# Patient Record
Sex: FEMALE | Race: BLACK OR AFRICAN AMERICAN | Employment: OTHER | URBAN - METROPOLITAN AREA
[De-identification: names, ages, dates, MRNs, and addresses within clinical notes are randomized per-mention and may not be internally consistent; named-entity substitution may affect disease eponyms.]

---

## 2017-02-03 DIAGNOSIS — E78.2 MIXED HYPERLIPIDEMIA: ICD-10-CM

## 2017-02-03 RX ORDER — SIMVASTATIN 20 MG
20 TABLET ORAL EVERY EVENING
Qty: 90 TABLET | Refills: 3 | Status: SHIPPED | OUTPATIENT
Start: 2017-02-03 | End: 2017-02-14 | Stop reason: ALTCHOICE

## 2017-02-07 DIAGNOSIS — E11.9 CONTROLLED TYPE 2 DIABETES MELLITUS WITHOUT COMPLICATION, WITHOUT LONG-TERM CURRENT USE OF INSULIN (HCC): ICD-10-CM

## 2017-02-07 DIAGNOSIS — E78.2 MIXED HYPERLIPIDEMIA: ICD-10-CM

## 2017-02-07 DIAGNOSIS — I10 ESSENTIAL HYPERTENSION: ICD-10-CM

## 2017-02-07 LAB
ALBUMIN SERPL-MCNC: 4.1 G/DL (ref 3.9–4.9)
ALP BLD-CCNC: 111 U/L (ref 40–130)
ALT SERPL-CCNC: 12 U/L (ref 0–33)
ANION GAP SERPL CALCULATED.3IONS-SCNC: 15 MEQ/L (ref 7–13)
AST SERPL-CCNC: 15 U/L (ref 0–35)
BASOPHILS ABSOLUTE: 0 K/UL (ref 0–0.2)
BASOPHILS RELATIVE PERCENT: 0.6 %
BILIRUB SERPL-MCNC: 0.4 MG/DL (ref 0–1.2)
BUN BLDV-MCNC: 18 MG/DL (ref 8–23)
CALCIUM SERPL-MCNC: 9.3 MG/DL (ref 8.6–10.2)
CHLORIDE BLD-SCNC: 103 MEQ/L (ref 98–107)
CHOLESTEROL, TOTAL: 224 MG/DL (ref 0–199)
CO2: 22 MEQ/L (ref 22–29)
CREAT SERPL-MCNC: 0.92 MG/DL (ref 0.5–0.9)
CREATININE URINE: 42.5 MG/DL
EOSINOPHILS ABSOLUTE: 0.1 K/UL (ref 0–0.7)
EOSINOPHILS RELATIVE PERCENT: 2.1 %
GFR AFRICAN AMERICAN: >60
GFR NON-AFRICAN AMERICAN: 59.2
GLOBULIN: 3.1 G/DL (ref 2.3–3.5)
GLUCOSE BLD-MCNC: 264 MG/DL (ref 74–109)
HBA1C MFR BLD: 8.3 % (ref 4.8–5.9)
HCT VFR BLD CALC: 38.5 % (ref 37–47)
HDLC SERPL-MCNC: 51 MG/DL (ref 40–59)
HEMOGLOBIN: 12.6 G/DL (ref 12–16)
LDL CHOLESTEROL CALCULATED: 146 MG/DL (ref 0–129)
LYMPHOCYTES ABSOLUTE: 1.5 K/UL (ref 1–4.8)
LYMPHOCYTES RELATIVE PERCENT: 23 %
MCH RBC QN AUTO: 29.1 PG (ref 27–31.3)
MCHC RBC AUTO-ENTMCNC: 32.8 % (ref 33–37)
MCV RBC AUTO: 88.9 FL (ref 82–100)
MICROALBUMIN UR-MCNC: 66.6 MG/DL
MICROALBUMIN/CREAT UR-RTO: 1567.1 MG/G (ref 0–30)
MONOCYTES ABSOLUTE: 0.4 K/UL (ref 0.2–0.8)
MONOCYTES RELATIVE PERCENT: 6.1 %
NEUTROPHILS ABSOLUTE: 4.5 K/UL (ref 1.4–6.5)
NEUTROPHILS RELATIVE PERCENT: 68.2 %
PDW BLD-RTO: 14.9 % (ref 11.5–14.5)
PLATELET # BLD: 142 K/UL (ref 130–400)
POTASSIUM SERPL-SCNC: 4.6 MEQ/L (ref 3.5–5.1)
RBC # BLD: 4.34 M/UL (ref 4.2–5.4)
SODIUM BLD-SCNC: 140 MEQ/L (ref 132–144)
TOTAL PROTEIN: 7.2 G/DL (ref 6.4–8.1)
TRIGL SERPL-MCNC: 135 MG/DL (ref 0–200)
WBC # BLD: 6.6 K/UL (ref 4.8–10.8)

## 2017-02-14 ENCOUNTER — OFFICE VISIT (OUTPATIENT)
Dept: FAMILY MEDICINE CLINIC | Age: 77
End: 2017-02-14

## 2017-02-14 VITALS
TEMPERATURE: 98 F | HEIGHT: 65 IN | WEIGHT: 234 LBS | BODY MASS INDEX: 38.99 KG/M2 | HEART RATE: 72 BPM | RESPIRATION RATE: 18 BRPM | SYSTOLIC BLOOD PRESSURE: 138 MMHG | DIASTOLIC BLOOD PRESSURE: 80 MMHG

## 2017-02-14 DIAGNOSIS — I10 ESSENTIAL HYPERTENSION: ICD-10-CM

## 2017-02-14 DIAGNOSIS — Z78.0 MENOPAUSE: ICD-10-CM

## 2017-02-14 DIAGNOSIS — E66.01 MORBID OBESITY DUE TO EXCESS CALORIES (HCC): ICD-10-CM

## 2017-02-14 DIAGNOSIS — E11.9 CONTROLLED TYPE 2 DIABETES MELLITUS WITHOUT COMPLICATION, WITHOUT LONG-TERM CURRENT USE OF INSULIN (HCC): Primary | ICD-10-CM

## 2017-02-14 DIAGNOSIS — E78.2 MIXED HYPERLIPIDEMIA: ICD-10-CM

## 2017-02-14 DIAGNOSIS — K21.9 GASTROESOPHAGEAL REFLUX DISEASE WITHOUT ESOPHAGITIS: ICD-10-CM

## 2017-02-14 PROCEDURE — 1090F PRES/ABSN URINE INCON ASSESS: CPT | Performed by: FAMILY MEDICINE

## 2017-02-14 PROCEDURE — G8400 PT W/DXA NO RESULTS DOC: HCPCS | Performed by: FAMILY MEDICINE

## 2017-02-14 PROCEDURE — 1123F ACP DISCUSS/DSCN MKR DOCD: CPT | Performed by: FAMILY MEDICINE

## 2017-02-14 PROCEDURE — G8417 CALC BMI ABV UP PARAM F/U: HCPCS | Performed by: FAMILY MEDICINE

## 2017-02-14 PROCEDURE — G8484 FLU IMMUNIZE NO ADMIN: HCPCS | Performed by: FAMILY MEDICINE

## 2017-02-14 PROCEDURE — G8427 DOCREV CUR MEDS BY ELIG CLIN: HCPCS | Performed by: FAMILY MEDICINE

## 2017-02-14 PROCEDURE — 4040F PNEUMOC VAC/ADMIN/RCVD: CPT | Performed by: FAMILY MEDICINE

## 2017-02-14 PROCEDURE — 1036F TOBACCO NON-USER: CPT | Performed by: FAMILY MEDICINE

## 2017-02-14 PROCEDURE — 99214 OFFICE O/P EST MOD 30 MIN: CPT | Performed by: FAMILY MEDICINE

## 2017-02-14 RX ORDER — ATORVASTATIN CALCIUM 20 MG/1
20 TABLET, FILM COATED ORAL DAILY
Qty: 30 TABLET | Refills: 3 | Status: SHIPPED | OUTPATIENT
Start: 2017-02-14 | End: 2017-07-03 | Stop reason: SDUPTHER

## 2017-02-14 RX ORDER — METFORMIN HYDROCHLORIDE 500 MG/1
1500 TABLET, EXTENDED RELEASE ORAL
Qty: 270 TABLET | Refills: 3 | Status: SHIPPED | OUTPATIENT
Start: 2017-02-14 | End: 2018-03-16 | Stop reason: SDUPTHER

## 2017-02-14 ASSESSMENT — PATIENT HEALTH QUESTIONNAIRE - PHQ9
SUM OF ALL RESPONSES TO PHQ9 QUESTIONS 1 & 2: 0
2. FEELING DOWN, DEPRESSED OR HOPELESS: 0
1. LITTLE INTEREST OR PLEASURE IN DOING THINGS: 0
SUM OF ALL RESPONSES TO PHQ QUESTIONS 1-9: 0

## 2017-02-23 ENCOUNTER — HOSPITAL ENCOUNTER (OUTPATIENT)
Dept: GENERAL RADIOLOGY | Age: 77
Discharge: HOME | End: 2017-02-23
Payer: MEDICARE

## 2017-02-23 DIAGNOSIS — Z78.0 MENOPAUSE: ICD-10-CM

## 2017-02-23 PROCEDURE — 77080 DXA BONE DENSITY AXIAL: CPT

## 2017-02-27 DIAGNOSIS — K21.9 GASTROESOPHAGEAL REFLUX DISEASE WITHOUT ESOPHAGITIS: ICD-10-CM

## 2017-02-27 DIAGNOSIS — I10 ESSENTIAL HYPERTENSION: ICD-10-CM

## 2017-02-27 RX ORDER — AMLODIPINE BESYLATE 5 MG/1
5 TABLET ORAL DAILY
Qty: 90 TABLET | Refills: 3 | Status: SHIPPED | OUTPATIENT
Start: 2017-02-27 | End: 2018-04-13 | Stop reason: ALTCHOICE

## 2017-02-27 RX ORDER — OMEPRAZOLE 40 MG/1
40 CAPSULE, DELAYED RELEASE ORAL DAILY
Qty: 90 CAPSULE | Refills: 3 | Status: SHIPPED | OUTPATIENT
Start: 2017-02-27 | End: 2018-04-13 | Stop reason: SDUPTHER

## 2017-03-17 RX ORDER — RISPERIDONE 0.5 MG/1
0.5 TABLET, FILM COATED ORAL DAILY
Qty: 30 TABLET | Refills: 1 | Status: SHIPPED | OUTPATIENT
Start: 2017-03-17 | End: 2017-06-06 | Stop reason: SDUPTHER

## 2017-05-17 DIAGNOSIS — E78.2 MIXED HYPERLIPIDEMIA: ICD-10-CM

## 2017-05-17 DIAGNOSIS — E11.9 CONTROLLED TYPE 2 DIABETES MELLITUS WITHOUT COMPLICATION, WITHOUT LONG-TERM CURRENT USE OF INSULIN (HCC): ICD-10-CM

## 2017-05-17 DIAGNOSIS — I10 ESSENTIAL HYPERTENSION: ICD-10-CM

## 2017-05-17 LAB
ALBUMIN SERPL-MCNC: 4.4 G/DL (ref 3.9–4.9)
ALP BLD-CCNC: 101 U/L (ref 40–130)
ALT SERPL-CCNC: 9 U/L (ref 0–33)
ANION GAP SERPL CALCULATED.3IONS-SCNC: 16 MEQ/L (ref 7–13)
AST SERPL-CCNC: 10 U/L (ref 0–35)
BILIRUB SERPL-MCNC: 0.4 MG/DL (ref 0–1.2)
BUN BLDV-MCNC: 21 MG/DL (ref 8–23)
CALCIUM SERPL-MCNC: 9.6 MG/DL (ref 8.6–10.2)
CHLORIDE BLD-SCNC: 101 MEQ/L (ref 98–107)
CHOLESTEROL, TOTAL: 162 MG/DL (ref 0–199)
CO2: 22 MEQ/L (ref 22–29)
CREAT SERPL-MCNC: 1.16 MG/DL (ref 0.5–0.9)
GFR AFRICAN AMERICAN: 54.8
GFR NON-AFRICAN AMERICAN: 45.3
GLOBULIN: 2.9 G/DL (ref 2.3–3.5)
GLUCOSE BLD-MCNC: 245 MG/DL (ref 74–109)
HBA1C MFR BLD: 9.9 % (ref 4.8–5.9)
HDLC SERPL-MCNC: 51 MG/DL (ref 40–59)
LDL CHOLESTEROL CALCULATED: 93 MG/DL (ref 0–129)
POTASSIUM SERPL-SCNC: 4.7 MEQ/L (ref 3.5–5.1)
SODIUM BLD-SCNC: 139 MEQ/L (ref 132–144)
TOTAL PROTEIN: 7.3 G/DL (ref 6.4–8.1)
TRIGL SERPL-MCNC: 89 MG/DL (ref 0–200)

## 2017-06-06 RX ORDER — RISPERIDONE 0.5 MG/1
0.5 TABLET, FILM COATED ORAL DAILY
Qty: 30 TABLET | Refills: 1 | Status: SHIPPED | OUTPATIENT
Start: 2017-06-06 | End: 2017-08-28 | Stop reason: SDUPTHER

## 2017-07-03 DIAGNOSIS — E78.2 MIXED HYPERLIPIDEMIA: ICD-10-CM

## 2017-07-03 RX ORDER — ATORVASTATIN CALCIUM 20 MG/1
20 TABLET, FILM COATED ORAL DAILY
Qty: 30 TABLET | Refills: 3 | Status: SHIPPED | OUTPATIENT
Start: 2017-07-03 | End: 2017-12-01 | Stop reason: SDUPTHER

## 2017-08-28 RX ORDER — RISPERIDONE 0.5 MG/1
0.5 TABLET, FILM COATED ORAL DAILY
Qty: 30 TABLET | Refills: 1 | Status: SHIPPED | OUTPATIENT
Start: 2017-08-28 | End: 2017-12-01 | Stop reason: SDUPTHER

## 2017-12-01 DIAGNOSIS — E78.2 MIXED HYPERLIPIDEMIA: ICD-10-CM

## 2017-12-01 RX ORDER — RISPERIDONE 0.5 MG/1
0.5 TABLET, FILM COATED ORAL DAILY
Qty: 30 TABLET | Refills: 5 | Status: SHIPPED | OUTPATIENT
Start: 2017-12-01 | End: 2018-04-13 | Stop reason: ALTCHOICE

## 2017-12-01 RX ORDER — ATORVASTATIN CALCIUM 20 MG/1
20 TABLET, FILM COATED ORAL DAILY
Qty: 30 TABLET | Refills: 5 | Status: SHIPPED | OUTPATIENT
Start: 2017-12-01

## 2018-01-15 DIAGNOSIS — I10 ESSENTIAL HYPERTENSION: ICD-10-CM

## 2018-01-15 RX ORDER — METOPROLOL TARTRATE 100 MG/1
100 TABLET ORAL DAILY
Qty: 90 TABLET | Refills: 0 | Status: SHIPPED | OUTPATIENT
Start: 2018-01-15 | End: 2018-04-23 | Stop reason: SDUPTHER

## 2018-03-16 DIAGNOSIS — E11.9 CONTROLLED TYPE 2 DIABETES MELLITUS WITHOUT COMPLICATION, WITHOUT LONG-TERM CURRENT USE OF INSULIN (HCC): ICD-10-CM

## 2018-03-16 RX ORDER — METFORMIN HYDROCHLORIDE 500 MG/1
1500 TABLET, EXTENDED RELEASE ORAL
Qty: 90 TABLET | Refills: 0 | Status: SHIPPED | OUTPATIENT
Start: 2018-03-16 | End: 2018-04-23 | Stop reason: SDUPTHER

## 2018-03-16 NOTE — TELEPHONE ENCOUNTER
Pharmacy requests refill on medication.  Please approve or deny this request.  Last seen by you on 2/2017.  1 MONTH SUPPLY ONLY UNTIL APPT -PT MUST KEEP APPT BELOW     Future Appointments  Date Time Provider Mary Carter   4/3/2018 10:00 AM SCHEDULE, LAB [facility] PCP MARIA G Boudreaux   4/9/2018 9:30 AM Walker Cowan MD [address] [facility]

## 2018-04-03 DIAGNOSIS — E11.9 CONTROLLED TYPE 2 DIABETES MELLITUS WITHOUT COMPLICATION, WITHOUT LONG-TERM CURRENT USE OF INSULIN (HCC): Primary | ICD-10-CM

## 2018-04-03 DIAGNOSIS — E78.2 MIXED HYPERLIPIDEMIA: ICD-10-CM

## 2018-04-09 DIAGNOSIS — E78.2 MIXED HYPERLIPIDEMIA: ICD-10-CM

## 2018-04-09 DIAGNOSIS — E11.9 CONTROLLED TYPE 2 DIABETES MELLITUS WITHOUT COMPLICATION, WITHOUT LONG-TERM CURRENT USE OF INSULIN (HCC): ICD-10-CM

## 2018-04-09 LAB
ALBUMIN SERPL-MCNC: 4.1 G/DL (ref 3.9–4.9)
ALP BLD-CCNC: 115 U/L (ref 40–130)
ALT SERPL-CCNC: 18 U/L (ref 0–33)
ANION GAP SERPL CALCULATED.3IONS-SCNC: 15 MEQ/L (ref 7–13)
AST SERPL-CCNC: 15 U/L (ref 0–35)
BILIRUB SERPL-MCNC: 0.4 MG/DL (ref 0–1.2)
BUN BLDV-MCNC: 13 MG/DL (ref 8–23)
CALCIUM SERPL-MCNC: 9.5 MG/DL (ref 8.6–10.2)
CHLORIDE BLD-SCNC: 101 MEQ/L (ref 98–107)
CHOLESTEROL, TOTAL: 139 MG/DL (ref 0–199)
CO2: 25 MEQ/L (ref 22–29)
CREAT SERPL-MCNC: 0.84 MG/DL (ref 0.5–0.9)
GFR AFRICAN AMERICAN: >60
GFR NON-AFRICAN AMERICAN: >60
GLOBULIN: 2.7 G/DL (ref 2.3–3.5)
GLUCOSE BLD-MCNC: 182 MG/DL (ref 74–109)
HBA1C MFR BLD: 9.8 % (ref 4.8–5.9)
HDLC SERPL-MCNC: 38 MG/DL (ref 40–59)
LDL CHOLESTEROL CALCULATED: 71 MG/DL (ref 0–129)
POTASSIUM SERPL-SCNC: 5.7 MEQ/L (ref 3.5–5.1)
SODIUM BLD-SCNC: 141 MEQ/L (ref 132–144)
TOTAL PROTEIN: 6.8 G/DL (ref 6.4–8.1)
TRIGL SERPL-MCNC: 151 MG/DL (ref 0–200)

## 2018-04-13 ENCOUNTER — OFFICE VISIT (OUTPATIENT)
Dept: FAMILY MEDICINE CLINIC | Age: 78
End: 2018-04-13
Payer: MEDICARE

## 2018-04-13 VITALS
SYSTOLIC BLOOD PRESSURE: 126 MMHG | TEMPERATURE: 97.1 F | DIASTOLIC BLOOD PRESSURE: 82 MMHG | RESPIRATION RATE: 10 BRPM | OXYGEN SATURATION: 100 % | HEART RATE: 74 BPM | HEIGHT: 65 IN

## 2018-04-13 DIAGNOSIS — I10 ESSENTIAL HYPERTENSION: ICD-10-CM

## 2018-04-13 DIAGNOSIS — K21.9 GASTROESOPHAGEAL REFLUX DISEASE WITHOUT ESOPHAGITIS: ICD-10-CM

## 2018-04-13 DIAGNOSIS — E78.2 MIXED HYPERLIPIDEMIA: ICD-10-CM

## 2018-04-13 PROCEDURE — 4040F PNEUMOC VAC/ADMIN/RCVD: CPT | Performed by: FAMILY MEDICINE

## 2018-04-13 PROCEDURE — G8427 DOCREV CUR MEDS BY ELIG CLIN: HCPCS | Performed by: FAMILY MEDICINE

## 2018-04-13 PROCEDURE — G8399 PT W/DXA RESULTS DOCUMENT: HCPCS | Performed by: FAMILY MEDICINE

## 2018-04-13 PROCEDURE — G8421 BMI NOT CALCULATED: HCPCS | Performed by: FAMILY MEDICINE

## 2018-04-13 PROCEDURE — 99214 OFFICE O/P EST MOD 30 MIN: CPT | Performed by: FAMILY MEDICINE

## 2018-04-13 PROCEDURE — 1123F ACP DISCUSS/DSCN MKR DOCD: CPT | Performed by: FAMILY MEDICINE

## 2018-04-13 PROCEDURE — 1036F TOBACCO NON-USER: CPT | Performed by: FAMILY MEDICINE

## 2018-04-13 PROCEDURE — 1090F PRES/ABSN URINE INCON ASSESS: CPT | Performed by: FAMILY MEDICINE

## 2018-04-13 RX ORDER — OMEPRAZOLE 40 MG/1
40 CAPSULE, DELAYED RELEASE ORAL DAILY
Qty: 90 CAPSULE | Refills: 3 | Status: ON HOLD | OUTPATIENT
Start: 2018-04-13 | End: 2018-07-21 | Stop reason: HOSPADM

## 2018-04-13 RX ORDER — GLIPIZIDE 10 MG/1
10 TABLET ORAL DAILY
Qty: 90 TABLET | Refills: 3 | Status: ON HOLD | OUTPATIENT
Start: 2018-04-13 | End: 2018-07-21 | Stop reason: HOSPADM

## 2018-04-13 RX ORDER — AMLODIPINE BESYLATE 5 MG/1
5 TABLET ORAL DAILY
Qty: 90 TABLET | Refills: 3 | Status: ON HOLD | OUTPATIENT
Start: 2018-04-13 | End: 2018-07-21 | Stop reason: HOSPADM

## 2018-04-13 ASSESSMENT — PATIENT HEALTH QUESTIONNAIRE - PHQ9
SUM OF ALL RESPONSES TO PHQ QUESTIONS 1-9: 0
SUM OF ALL RESPONSES TO PHQ9 QUESTIONS 1 & 2: 0
1. LITTLE INTEREST OR PLEASURE IN DOING THINGS: 0
2. FEELING DOWN, DEPRESSED OR HOPELESS: 0

## 2018-04-13 ASSESSMENT — ENCOUNTER SYMPTOMS
ABDOMINAL PAIN: 0
TROUBLE SWALLOWING: 0
SHORTNESS OF BREATH: 0
VOMITING: 0
CONSTIPATION: 0
BLOOD IN STOOL: 0
DIARRHEA: 0
COUGH: 0
NAUSEA: 0
CHEST TIGHTNESS: 0

## 2018-04-23 DIAGNOSIS — I10 ESSENTIAL HYPERTENSION: ICD-10-CM

## 2018-04-23 DIAGNOSIS — E11.9 CONTROLLED TYPE 2 DIABETES MELLITUS WITHOUT COMPLICATION, WITHOUT LONG-TERM CURRENT USE OF INSULIN (HCC): ICD-10-CM

## 2018-04-24 ENCOUNTER — TELEPHONE (OUTPATIENT)
Dept: FAMILY MEDICINE CLINIC | Age: 78
End: 2018-04-24

## 2018-04-24 DIAGNOSIS — L60.2 THICKENED NAILS: Primary | ICD-10-CM

## 2018-04-25 RX ORDER — METFORMIN HYDROCHLORIDE 500 MG/1
1500 TABLET, EXTENDED RELEASE ORAL
Qty: 270 TABLET | Refills: 3 | Status: SHIPPED | OUTPATIENT
Start: 2018-04-25 | End: 2019-04-20

## 2018-04-25 RX ORDER — METOPROLOL TARTRATE 100 MG/1
100 TABLET ORAL DAILY
Qty: 90 TABLET | Refills: 3 | Status: SHIPPED | OUTPATIENT
Start: 2018-04-25 | End: 2019-04-25

## 2018-05-30 LAB
GLUCOSE BLD-MCNC: 282 MG/DL (ref 70–100)
GLUCOSE BLD-MCNC: 294 MG/DL (ref 70–100)

## 2018-05-31 LAB
ANION GAP SERPL CALCULATED.3IONS-SCNC: 19 MMOL/L (ref 10–20)
BICARBONATE: 19 MMOL/L (ref 21–32)
BUN / CREAT RATIO: 36 (ref 5–25)
CALCIUM SERPL-MCNC: 9.9 MG/DL (ref 8.6–10.3)
CHLORIDE BLD-SCNC: 104 MMOL/L (ref 98–107)
CREAT SERPL-MCNC: 1.57 MG/DL (ref 0.5–1.05)
GFR CALCULATED: 39
GLUCOSE BLD-MCNC: 182 MG/DL (ref 70–100)
GLUCOSE BLD-MCNC: 195 MG/DL (ref 70–100)
GLUCOSE BLD-MCNC: 219 MG/DL (ref 70–100)
GLUCOSE BLD-MCNC: 224 MG/DL (ref 70–100)
GLUCOSE: 225 MG/DL (ref 70–100)
HCT VFR BLD CALC: 20.7 % (ref 36.5–46.6)
HCT VFR BLD CALC: 22.3 % (ref 36.5–46.6)
HEMOGLOBIN: 7.1 G/DL (ref 11.8–15.3)
HEMOGLOBIN: 7.6 G/DL (ref 11.8–15.3)
POTASSIUM SERPL-SCNC: 3.9 MMOL/L (ref 3.5–5.1)
SODIUM BLD-SCNC: 138 MMOL/L (ref 136–145)
UREA NITROGEN: 56 MG/DL (ref 6–23)

## 2018-06-01 LAB
ANION GAP SERPL CALCULATED.3IONS-SCNC: 15 MMOL/L (ref 10–20)
BICARBONATE: 24 MMOL/L (ref 21–32)
BUN / CREAT RATIO: 37 (ref 5–25)
C DIFF TOXIN B: NEGATIVE
CALCIUM SERPL-MCNC: 9.4 MG/DL (ref 8.6–10.3)
CHLORIDE BLD-SCNC: 108 MMOL/L (ref 98–107)
CREAT SERPL-MCNC: 1.19 MG/DL (ref 0.5–1.05)
ERYTHROCYTE [DISTWIDTH] IN BLOOD BY AUTOMATED COUNT: 15.8 % (ref 12–15.4)
ERYTHROCYTE [DISTWIDTH] IN BLOOD BY AUTOMATED COUNT: 51.4 FL (ref 39.3–48.6)
GFR CALCULATED: 53
GLUCOSE BLD-MCNC: 143 MG/DL (ref 70–100)
GLUCOSE BLD-MCNC: 157 MG/DL (ref 70–100)
GLUCOSE BLD-MCNC: 196 MG/DL (ref 70–100)
GLUCOSE BLD-MCNC: 225 MG/DL (ref 70–100)
GLUCOSE: 146 MG/DL (ref 70–100)
HCT VFR BLD CALC: 21.2 % (ref 36.5–46.6)
HCT VFR BLD CALC: 24.9 % (ref 36.5–46.6)
HEMOGLOBIN: 7.2 G/DL (ref 11.8–15.3)
HEMOGLOBIN: 8 G/DL (ref 11.8–15.3)
MCH RBC QN AUTO: 30.3 PG (ref 27.5–33)
MCHC RBC AUTO-ENTMCNC: 34 G/DL (ref 30.1–35)
MCV RBC AUTO: 89.1 FL (ref 85.4–100)
NUCLEATED RBCS: 0.3 /100{WBCS}
PLATELET # BLD: 160 10*3/UL (ref 155–404)
PMV BLD AUTO: 13 FL (ref 9.9–12.1)
POTASSIUM SERPL-SCNC: 3.6 MMOL/L (ref 3.5–5.1)
RBC: 2.38 10*6/UL (ref 3.85–5.1)
RBCS COUNTED: 0.02 10*3/UL
SODIUM BLD-SCNC: 143 MMOL/L (ref 136–145)
UREA NITROGEN: 44 MG/DL (ref 6–23)
WBC: 7 10*3/UL (ref 4.4–9.9)

## 2018-06-02 LAB
GLUCOSE BLD-MCNC: 178 MG/DL (ref 70–100)
GLUCOSE BLD-MCNC: 186 MG/DL (ref 70–100)
GLUCOSE BLD-MCNC: 188 MG/DL (ref 70–100)
GLUCOSE BLD-MCNC: 199 MG/DL (ref 70–100)
HCT VFR BLD CALC: 22.5 % (ref 36.5–46.6)
HCT VFR BLD CALC: 23 % (ref 36.5–46.6)
HEMOGLOBIN: 7.4 G/DL (ref 11.8–15.3)
HEMOGLOBIN: 7.6 G/DL (ref 11.8–15.3)

## 2018-06-03 LAB
GLUCOSE BLD-MCNC: 130 MG/DL (ref 70–100)
GLUCOSE BLD-MCNC: 145 MG/DL (ref 70–100)
GLUCOSE BLD-MCNC: 197 MG/DL (ref 70–100)
GLUCOSE BLD-MCNC: 235 MG/DL (ref 70–100)

## 2018-06-04 LAB
ANION GAP SERPL CALCULATED.3IONS-SCNC: 14 MMOL/L (ref 10–20)
BASOPHILS # BLD: 0.2 % (ref 0.1–1.2)
BASOPHILS ABSOLUTE: 0.02 10*3/UL (ref 0.01–0.07)
BICARBONATE: 27 MMOL/L (ref 21–32)
BUN / CREAT RATIO: 19 (ref 5–25)
CALCIUM SERPL-MCNC: 9.2 MG/DL (ref 8.6–10.3)
CHLORIDE BLD-SCNC: 98 MMOL/L (ref 98–107)
CREAT SERPL-MCNC: 1.08 MG/DL (ref 0.5–1.05)
EOSINOPHIL # BLD: 1.2 % (ref 0–8.1)
EOSINOPHILS ABSOLUTE: 0.1 10*3/UL (ref 0.04–0.5)
ERYTHROCYTE [DISTWIDTH] IN BLOOD BY AUTOMATED COUNT: 14.9 % (ref 12–15.4)
ERYTHROCYTE [DISTWIDTH] IN BLOOD BY AUTOMATED COUNT: 49.6 FL (ref 39.3–48.6)
GFR CALCULATED: 59
GLUCOSE BLD-MCNC: 125 MG/DL (ref 70–100)
GLUCOSE BLD-MCNC: 70 MG/DL (ref 70–100)
GLUCOSE BLD-MCNC: 77 MG/DL (ref 70–100)
GLUCOSE BLD-MCNC: 87 MG/DL (ref 70–100)
GLUCOSE: 79 MG/DL (ref 70–100)
HCT VFR BLD CALC: 29.2 % (ref 36.5–46.6)
HEMOGLOBIN: 9.7 G/DL (ref 11.8–15.3)
IMMATURE GRANS (ABS): 0.07 10*3/UL (ref 0–0.21)
IMMATURE GRANULOCYTES: 0.8 %
LYMPHOCYTES # BLD: 17 % (ref 15.7–50.5)
LYMPHOCYTES ABSOLUTE: 1.45 10*3/UL (ref 0.4–2.84)
MCH RBC QN AUTO: 30.1 PG (ref 27.5–33)
MCHC RBC AUTO-ENTMCNC: 33.2 G/DL (ref 30.1–35)
MCV RBC AUTO: 90.7 FL (ref 85.4–100)
MONOCYTES # BLD: 11.7 % (ref 4.8–12.7)
MONOCYTES ABSOLUTE: 1 10*3/UL (ref 0.25–0.83)
NEUTROPHILS ABSOLUTE: 5.88 10*3/UL (ref 1.95–6.85)
NEUTROPHILS: 69.1 % (ref 36.8–73.2)
NUCLEATED RBCS: 0 /100{WBCS}
PLATELET # BLD: 173 10*3/UL (ref 155–404)
PMV BLD AUTO: 12.7 FL (ref 9.9–12.1)
POTASSIUM SERPL-SCNC: 3.7 MMOL/L (ref 3.5–5.1)
RBC: 3.22 10*6/UL (ref 3.85–5.1)
RBCS COUNTED: 0 10*3/UL
SODIUM BLD-SCNC: 135 MMOL/L (ref 136–145)
UREA NITROGEN: 21 MG/DL (ref 6–23)
WBC: 8.5 10*3/UL (ref 4.4–9.9)

## 2018-06-05 LAB
ANION GAP SERPL CALCULATED.3IONS-SCNC: 15 MMOL/L (ref 10–20)
BASOPHILS # BLD: 0.3 % (ref 0.1–1.2)
BASOPHILS ABSOLUTE: 0.02 10*3/UL (ref 0.01–0.07)
BICARBONATE: 23 MMOL/L (ref 21–32)
BUN / CREAT RATIO: 23 (ref 5–25)
CALCIUM SERPL-MCNC: 9.3 MG/DL (ref 8.6–10.3)
CHLORIDE BLD-SCNC: 104 MMOL/L (ref 98–107)
CREAT SERPL-MCNC: 1.06 MG/DL (ref 0.5–1.05)
EOSINOPHIL # BLD: 2 % (ref 0–8.1)
EOSINOPHILS ABSOLUTE: 0.12 10*3/UL (ref 0.04–0.5)
ERYTHROCYTE [DISTWIDTH] IN BLOOD BY AUTOMATED COUNT: 14.9 % (ref 12–15.4)
ERYTHROCYTE [DISTWIDTH] IN BLOOD BY AUTOMATED COUNT: 52.4 FL (ref 39.3–48.6)
GFR CALCULATED: >60
GLUCOSE BLD-MCNC: 54 MG/DL (ref 70–100)
GLUCOSE BLD-MCNC: 62 MG/DL (ref 70–100)
GLUCOSE BLD-MCNC: 62 MG/DL (ref 70–100)
GLUCOSE BLD-MCNC: 87 MG/DL (ref 70–100)
GLUCOSE BLD-MCNC: 88 MG/DL (ref 70–100)
GLUCOSE BLD-MCNC: 96 MG/DL (ref 70–100)
GLUCOSE: 72 MG/DL (ref 70–100)
HCT VFR BLD CALC: 30.1 % (ref 36.5–46.6)
HEMOGLOBIN: 9.8 G/DL (ref 11.8–15.3)
IMMATURE GRANS (ABS): 0.05 10*3/UL (ref 0–0.21)
IMMATURE GRANULOCYTES: 0.9 %
LYMPHOCYTES # BLD: 17.5 % (ref 15.7–50.5)
LYMPHOCYTES ABSOLUTE: 1.03 10*3/UL (ref 0.4–2.84)
MCH RBC QN AUTO: 30.9 PG (ref 27.5–33)
MCHC RBC AUTO-ENTMCNC: 32.6 G/DL (ref 30.1–35)
MCV RBC AUTO: 95 FL (ref 85.4–100)
MONOCYTES # BLD: 16 % (ref 4.8–12.7)
MONOCYTES ABSOLUTE: 0.94 10*3/UL (ref 0.25–0.83)
NEUTROPHILS ABSOLUTE: 3.71 10*3/UL (ref 1.95–6.85)
NEUTROPHILS: 63.3 % (ref 36.8–73.2)
NUCLEATED RBCS: 0.3 /100{WBCS}
PLATELET # BLD: 162 10*3/UL (ref 155–404)
PMV BLD AUTO: 12.4 FL (ref 9.9–12.1)
POTASSIUM SERPL-SCNC: 4.6 MMOL/L (ref 3.5–5.1)
RBC: 3.17 10*6/UL (ref 3.85–5.1)
RBCS COUNTED: 0.02 10*3/UL
SODIUM BLD-SCNC: 137 MMOL/L (ref 136–145)
UREA NITROGEN: 24 MG/DL (ref 6–23)
WBC: 5.9 10*3/UL (ref 4.4–9.9)

## 2018-06-08 ENCOUNTER — CARE COORDINATION (OUTPATIENT)
Dept: CASE MANAGEMENT | Age: 78
End: 2018-06-08

## 2018-07-16 ENCOUNTER — CARE COORDINATION (OUTPATIENT)
Dept: CASE MANAGEMENT | Age: 78
End: 2018-07-16

## 2018-07-16 NOTE — CARE COORDINATION
Received a Patient Ping alert patient discharged from [facility] on  with Antoinette Gibson. Will continue to follow.  LASHONDA PakN RN  Care Transition Coordinator  [phone]

## 2018-07-17 ENCOUNTER — CARE COORDINATION (OUTPATIENT)
Dept: CASE MANAGEMENT | Age: 78
End: 2018-07-17

## 2018-07-18 ENCOUNTER — CARE COORDINATION (OUTPATIENT)
Dept: CARE COORDINATION | Age: 78
End: 2018-07-18

## 2018-07-18 NOTE — CARE COORDINATION
ACC WAS NOTIFIED OF PATIENT DC FROM SNF. [facility] ATTEMPTED TO CONTACT PATIENT AND LEFT DETAILED MESSAGE WITH EC LISTED. EC WAS ADVISED PATIENT NEEDS TO BE SEEN BY PCP AND TO CALL EITHER OFFICE @ [phone] OR AUTHOR, @ [phone] TO ASSIST IN APPOINTMENT.

## 2018-07-19 ENCOUNTER — HOSPITAL ENCOUNTER (INPATIENT)
Age: 78
LOS: 1 days | Discharge: NURSING, REHAB OR LONG-TERM CARE | DRG: 812 | End: 2018-07-21
Attending: EMERGENCY MEDICINE | Admitting: INTERNAL MEDICINE
Payer: MEDICARE

## 2018-07-19 DIAGNOSIS — N17.9 ACUTE KIDNEY INJURY (HCC): ICD-10-CM

## 2018-07-19 DIAGNOSIS — D50.0 IRON DEFICIENCY ANEMIA DUE TO CHRONIC BLOOD LOSS: Primary | ICD-10-CM

## 2018-07-19 PROBLEM — D64.9 ANEMIA: Status: ACTIVE | Noted: 2018-07-19

## 2018-07-19 LAB
ABO/RH: NORMAL
ALBUMIN SERPL-MCNC: 3.8 G/DL (ref 3.9–4.9)
ALP BLD-CCNC: 112 U/L (ref 40–130)
ALT SERPL-CCNC: 18 U/L (ref 0–33)
ANION GAP SERPL CALCULATED.3IONS-SCNC: 13 MEQ/L (ref 7–13)
ANTIBODY SCREEN: NORMAL
AST SERPL-CCNC: 11 U/L (ref 0–35)
BASOPHILS ABSOLUTE: 0 K/UL (ref 0–0.2)
BASOPHILS RELATIVE PERCENT: 0.6 %
BILIRUB SERPL-MCNC: 0.5 MG/DL (ref 0–1.2)
BUN BLDV-MCNC: 37 MG/DL (ref 8–23)
CALCIUM SERPL-MCNC: 9.6 MG/DL (ref 8.6–10.2)
CHLORIDE BLD-SCNC: 97 MEQ/L (ref 98–107)
CO2: 27 MEQ/L (ref 22–29)
CREAT SERPL-MCNC: 1.37 MG/DL (ref 0.5–0.9)
EOSINOPHILS ABSOLUTE: 0 K/UL (ref 0–0.7)
EOSINOPHILS RELATIVE PERCENT: 0.3 %
FOLATE: 14.2 NG/ML (ref 7.3–26.1)
GFR AFRICAN AMERICAN: 45.1
GFR NON-AFRICAN AMERICAN: 37.3
GLOBULIN: 3.3 G/DL (ref 2.3–3.5)
GLUCOSE BLD-MCNC: 275 MG/DL (ref 60–115)
GLUCOSE BLD-MCNC: 287 MG/DL (ref 60–115)
GLUCOSE BLD-MCNC: 297 MG/DL (ref 74–109)
HCT VFR BLD CALC: 17.9 % (ref 37–47)
HCT VFR BLD CALC: 19.9 % (ref 37–47)
HEMOGLOBIN: 5.9 G/DL (ref 12–16)
HEMOGLOBIN: 6.9 G/DL (ref 12–16)
INR BLD: 1
IRON SATURATION: 33 % (ref 11–46)
IRON: 70 UG/DL (ref 37–145)
LYMPHOCYTES ABSOLUTE: 1 K/UL (ref 1–4.8)
LYMPHOCYTES RELATIVE PERCENT: 19.8 %
MCH RBC QN AUTO: 29.4 PG (ref 27–31.3)
MCHC RBC AUTO-ENTMCNC: 34.5 % (ref 33–37)
MCV RBC AUTO: 85.3 FL (ref 82–100)
MONOCYTES ABSOLUTE: 0.7 K/UL (ref 0.2–0.8)
MONOCYTES RELATIVE PERCENT: 13.8 %
NEUTROPHILS ABSOLUTE: 3.4 K/UL (ref 1.4–6.5)
NEUTROPHILS RELATIVE PERCENT: 65.5 %
PDW BLD-RTO: 15.4 % (ref 11.5–14.5)
PERFORMED ON: ABNORMAL
PERFORMED ON: ABNORMAL
PLATELET # BLD: 152 K/UL (ref 130–400)
POTASSIUM SERPL-SCNC: 5.4 MEQ/L (ref 3.5–5.1)
PROTHROMBIN TIME: 10.8 SEC (ref 9.6–12.3)
RBC # BLD: 2.34 M/UL (ref 4.2–5.4)
SODIUM BLD-SCNC: 137 MEQ/L (ref 132–144)
TOTAL IRON BINDING CAPACITY: 214 UG/DL (ref 178–450)
TOTAL PROTEIN: 7.1 G/DL (ref 6.4–8.1)
VITAMIN B-12: 726 PG/ML (ref 232–1245)
WBC # BLD: 5.2 K/UL (ref 4.8–10.8)

## 2018-07-19 PROCEDURE — 85610 PROTHROMBIN TIME: CPT

## 2018-07-19 PROCEDURE — 86923 COMPATIBILITY TEST ELECTRIC: CPT

## 2018-07-19 PROCEDURE — 99284 EMERGENCY DEPT VISIT MOD MDM: CPT

## 2018-07-19 PROCEDURE — 86901 BLOOD TYPING SEROLOGIC RH(D): CPT

## 2018-07-19 PROCEDURE — G0378 HOSPITAL OBSERVATION PER HR: HCPCS

## 2018-07-19 PROCEDURE — 2580000003 HC RX 258: Performed by: EMERGENCY MEDICINE

## 2018-07-19 PROCEDURE — 82746 ASSAY OF FOLIC ACID SERUM: CPT

## 2018-07-19 PROCEDURE — 83540 ASSAY OF IRON: CPT

## 2018-07-19 PROCEDURE — 85018 HEMOGLOBIN: CPT

## 2018-07-19 PROCEDURE — 86850 RBC ANTIBODY SCREEN: CPT

## 2018-07-19 PROCEDURE — 85014 HEMATOCRIT: CPT

## 2018-07-19 PROCEDURE — 36415 COLL VENOUS BLD VENIPUNCTURE: CPT

## 2018-07-19 PROCEDURE — 36430 TRANSFUSION BLD/BLD COMPNT: CPT

## 2018-07-19 PROCEDURE — 82607 VITAMIN B-12: CPT

## 2018-07-19 PROCEDURE — P9016 RBC LEUKOCYTES REDUCED: HCPCS

## 2018-07-19 PROCEDURE — 86900 BLOOD TYPING SEROLOGIC ABO: CPT

## 2018-07-19 PROCEDURE — 85025 COMPLETE CBC W/AUTO DIFF WBC: CPT

## 2018-07-19 PROCEDURE — 83550 IRON BINDING TEST: CPT

## 2018-07-19 PROCEDURE — 80053 COMPREHEN METABOLIC PANEL: CPT

## 2018-07-19 RX ORDER — ONDANSETRON 2 MG/ML
4 INJECTION INTRAMUSCULAR; INTRAVENOUS EVERY 6 HOURS PRN
Status: DISCONTINUED | OUTPATIENT
Start: 2018-07-19 | End: 2018-07-21 | Stop reason: HOSPADM

## 2018-07-19 RX ORDER — SODIUM CHLORIDE 9 MG/ML
INJECTION, SOLUTION INTRAVENOUS CONTINUOUS
Status: DISCONTINUED | OUTPATIENT
Start: 2018-07-19 | End: 2018-07-21 | Stop reason: HOSPADM

## 2018-07-19 RX ORDER — 0.9 % SODIUM CHLORIDE 0.9 %
250 INTRAVENOUS SOLUTION INTRAVENOUS ONCE
Status: COMPLETED | OUTPATIENT
Start: 2018-07-19 | End: 2018-07-20

## 2018-07-19 RX ORDER — DEXTROSE MONOHYDRATE 50 MG/ML
100 INJECTION, SOLUTION INTRAVENOUS PRN
Status: DISCONTINUED | OUTPATIENT
Start: 2018-07-19 | End: 2018-07-21 | Stop reason: HOSPADM

## 2018-07-19 RX ORDER — SODIUM CHLORIDE 0.9 % (FLUSH) 0.9 %
10 SYRINGE (ML) INJECTION EVERY 12 HOURS SCHEDULED
Status: DISCONTINUED | OUTPATIENT
Start: 2018-07-19 | End: 2018-07-21 | Stop reason: HOSPADM

## 2018-07-19 RX ORDER — NICOTINE POLACRILEX 4 MG
15 LOZENGE BUCCAL PRN
Status: DISCONTINUED | OUTPATIENT
Start: 2018-07-19 | End: 2018-07-21 | Stop reason: HOSPADM

## 2018-07-19 RX ORDER — SODIUM CHLORIDE 0.9 % (FLUSH) 0.9 %
10 SYRINGE (ML) INJECTION PRN
Status: DISCONTINUED | OUTPATIENT
Start: 2018-07-19 | End: 2018-07-21 | Stop reason: HOSPADM

## 2018-07-19 RX ORDER — DEXTROSE MONOHYDRATE 25 G/50ML
12.5 INJECTION, SOLUTION INTRAVENOUS PRN
Status: DISCONTINUED | OUTPATIENT
Start: 2018-07-19 | End: 2018-07-21 | Stop reason: HOSPADM

## 2018-07-19 RX ADMIN — SODIUM CHLORIDE 250 ML: 9 INJECTION, SOLUTION INTRAVENOUS at 22:35

## 2018-07-19 RX ADMIN — SODIUM CHLORIDE: 9 INJECTION, SOLUTION INTRAVENOUS at 13:14

## 2018-07-19 ASSESSMENT — PAIN SCALES - GENERAL
PAINLEVEL_OUTOF10: 0
PAINLEVEL_OUTOF10: 0

## 2018-07-19 NOTE — ED NOTES
Bed: 10  Expected date: 7/19/18  Expected time: 11:27 AM  Means of arrival: [facility]  Comments:  [address] Low Hgb.  No c/o. 120/64,85,100%     Lucia Walker RN  07/19/18 7435

## 2018-07-19 NOTE — ED NOTES
Pt pulled IV out . New one inserted prior to calling reort.  Pt moves about in bed and gets up rapidly     Antonia Lo RN  07/19/18 0328

## 2018-07-19 NOTE — H&P
Hospital Medicine History & Physical      PCP: Hemalatha Hughes MD    Date of Admission: 7/19/2018    Date of Service: Pt seen/examined on 7/19/18 and Admitted to Observation. Chief Complaint:  Low H&H      History Of Present Illness:      66 y.o. female who presented to [facility] ED with complaint of low H&H [place]. She has a history of DM2, anemia, schizophrenia, HTN, obesity, HTN, HDL, GERD. Patient was sent for a low H&H of 6.6/19.4 resulted this morning. Patient was recently admitted to [facility] on 7/13/18 from SNF. Patient is currently resting in bed and is pleasantly confused and alert to self. She is unsure why she is here at this time. When informed of lab results and told she needs a blood transfusion she was agreeable. She states feeling lightheaded and dizzy earlier this morning, but denies cp/sob/n/v/d/fever/chills/rash. Past Medical History:          Diagnosis Date    DJD (degenerative joint disease)     DM II (diabetes mellitus, type II), controlled ([address]     Embedded toenail     Financial difficulties     Financial constraints    Hypertension     Obesity     obesity with slight improvement       Past Surgical History:          Procedure Laterality Date    UPPER GASTROINTESTINAL ENDOSCOPY         Medications Prior to Admission:      Prior to Admission medications    Medication Sig Start Date End Date Taking?  Authorizing Provider   metoprolol (LOPRESSOR) 100 MG tablet Take 1 tablet by mouth Daily 4/25/18 4/25/19  Chino Brewster MD   metFORMIN (GLUCOPHAGE-XR) 500 MG extended release tablet Take 3 tablets by mouth daily (with breakfast) 4/25/18 4/20/19  Chino Brewster MD   omeprazole (PRILOSEC) 40 MG delayed release capsule Take 1 capsule by mouth daily 4/13/18 4/13/19  Chino Brewster MD   amLODIPine (NORVASC) 5 MG tablet Take 1 tablet by mouth daily 4/13/18   Chino Brewster MD   glipiZIDE (GLUCOTROL) 10 MG tablet Take 1 tablet by mouth daily 4/13/18 4/13/19

## 2018-07-19 NOTE — ED PROVIDER NOTES
alert. No cranial nerve deficit. Skin: Skin is warm and dry. No rash noted. She is not diaphoretic. Psychiatric: She has a normal mood and affect. Her behavior is normal.       DIAGNOSTIC RESULTS     EKG: All EKG's are interpreted by the Emergency Department Physician who either signs or Co-signs this chart in the absence of a cardiologist.        RADIOLOGY:   Non-plain film images such as CT, Ultrasound and MRI are read by the radiologist. Plain radiographic images are visualized and preliminarily interpreted by the emergency physician with the below findings:        Interpretation per the Radiologist below, if available at the time of this note:    No orders to display         ED BEDSIDE ULTRASOUND:   Performed by ED Physician - none    LABS:  Labs Reviewed   CBC WITH AUTO DIFFERENTIAL   COMPREHENSIVE METABOLIC PANEL   PROTIME-INR   TYPE AND SCREEN       All other labs were within normal range or not returned as of this dictation. EMERGENCY DEPARTMENT COURSE and DIFFERENTIAL DIAGNOSIS/MDM:   Vitals:    Vitals:    07/19/18 1139   BP: 133/65   Pulse: 86   Resp: 16   Temp: 97.7 °F (36.5 °C)   TempSrc: Oral   SpO2: 98%   Weight: 210 lb (95.3 kg)   Height: 5' 7\" (1.702 m)       Patient was sent in for low hemoglobin. Her hemoglobin was 6.9. She was heme-negative. Her vitals were stable. I did order some iron studies but did order a transfusion of 2 units of packed RBCs. She also has an acute kidney injury. Patient will be admitted to the hospital    MDM      [address] time was 30 minutes, excluding separately reportable procedures. There was a high probability of clinically significant/life threatening deterioration in the patient's condition which required my urgent intervention. CONSULTS:  None    PROCEDURES:  Unless otherwise noted below, none     Procedures    FINAL IMPRESSION      1. Iron deficiency anemia due to chronic blood loss    2.  Acute

## 2018-07-20 LAB
ANION GAP SERPL CALCULATED.3IONS-SCNC: 15 MEQ/L (ref 7–13)
BLOOD BANK DISPENSE STATUS: NORMAL
BLOOD BANK DISPENSE STATUS: NORMAL
BLOOD BANK PRODUCT CODE: NORMAL
BLOOD BANK PRODUCT CODE: NORMAL
BPU ID: NORMAL
BPU ID: NORMAL
BUN BLDV-MCNC: 39 MG/DL (ref 8–23)
CALCIUM SERPL-MCNC: 9.2 MG/DL (ref 8.6–10.2)
CHLORIDE BLD-SCNC: 101 MEQ/L (ref 98–107)
CO2: 23 MEQ/L (ref 22–29)
CREAT SERPL-MCNC: 1.25 MG/DL (ref 0.5–0.9)
DESCRIPTION BLOOD BANK: NORMAL
DESCRIPTION BLOOD BANK: NORMAL
GFR AFRICAN AMERICAN: 50.1
GFR NON-AFRICAN AMERICAN: 41.4
GLUCOSE BLD-MCNC: 195 MG/DL (ref 60–115)
GLUCOSE BLD-MCNC: 204 MG/DL (ref 74–109)
GLUCOSE BLD-MCNC: 219 MG/DL (ref 60–115)
GLUCOSE BLD-MCNC: 247 MG/DL (ref 60–115)
GLUCOSE BLD-MCNC: 293 MG/DL (ref 60–115)
HCT VFR BLD CALC: 24 % (ref 37–47)
HEMOGLOBIN: 8.3 G/DL (ref 12–16)
MCH RBC QN AUTO: 30.2 PG (ref 27–31.3)
MCHC RBC AUTO-ENTMCNC: 34.5 % (ref 33–37)
MCV RBC AUTO: 87.7 FL (ref 82–100)
PDW BLD-RTO: 15.5 % (ref 11.5–14.5)
PERFORMED ON: ABNORMAL
PLATELET # BLD: 149 K/UL (ref 130–400)
POTASSIUM REFLEX MAGNESIUM: 5.1 MEQ/L (ref 3.5–5.1)
RBC # BLD: 2.74 M/UL (ref 4.2–5.4)
SODIUM BLD-SCNC: 139 MEQ/L (ref 132–144)
WBC # BLD: 5.2 K/UL (ref 4.8–10.8)

## 2018-07-20 PROCEDURE — 2580000003 HC RX 258: Performed by: EMERGENCY MEDICINE

## 2018-07-20 PROCEDURE — 6360000002 HC RX W HCPCS: Performed by: CLINICAL NURSE SPECIALIST

## 2018-07-20 PROCEDURE — 6370000000 HC RX 637 (ALT 250 FOR IP): Performed by: INTERNAL MEDICINE

## 2018-07-20 PROCEDURE — 85027 COMPLETE CBC AUTOMATED: CPT

## 2018-07-20 PROCEDURE — 6360000002 HC RX W HCPCS: Performed by: INTERNAL MEDICINE

## 2018-07-20 PROCEDURE — 6370000000 HC RX 637 (ALT 250 FOR IP): Performed by: CLINICAL NURSE SPECIALIST

## 2018-07-20 PROCEDURE — 99222 1ST HOSP IP/OBS MODERATE 55: CPT | Performed by: CLINICAL NURSE SPECIALIST

## 2018-07-20 PROCEDURE — 1210000000 HC MED SURG R&B

## 2018-07-20 PROCEDURE — 36415 COLL VENOUS BLD VENIPUNCTURE: CPT

## 2018-07-20 PROCEDURE — 36430 TRANSFUSION BLD/BLD COMPNT: CPT

## 2018-07-20 PROCEDURE — P9016 RBC LEUKOCYTES REDUCED: HCPCS

## 2018-07-20 PROCEDURE — 6370000000 HC RX 637 (ALT 250 FOR IP): Performed by: NURSE PRACTITIONER

## 2018-07-20 PROCEDURE — 80048 BASIC METABOLIC PNL TOTAL CA: CPT

## 2018-07-20 RX ORDER — ATORVASTATIN CALCIUM 20 MG/1
20 TABLET, FILM COATED ORAL DAILY
Status: DISCONTINUED | OUTPATIENT
Start: 2018-07-20 | End: 2018-07-21 | Stop reason: HOSPADM

## 2018-07-20 RX ORDER — HALOPERIDOL 5 MG/ML
5 INJECTION INTRAMUSCULAR EVERY 4 HOURS PRN
Status: DISCONTINUED | OUTPATIENT
Start: 2018-07-20 | End: 2018-07-21 | Stop reason: HOSPADM

## 2018-07-20 RX ORDER — OLANZAPINE 5 MG/1
2.5 TABLET, ORALLY DISINTEGRATING ORAL EVERY MORNING
Status: DISCONTINUED | OUTPATIENT
Start: 2018-07-20 | End: 2018-07-21 | Stop reason: HOSPADM

## 2018-07-20 RX ORDER — ACETAMINOPHEN 80 MG
TABLET,CHEWABLE ORAL ONCE
Status: DISCONTINUED | OUTPATIENT
Start: 2018-07-20 | End: 2018-07-21 | Stop reason: HOSPADM

## 2018-07-20 RX ORDER — FOLIC ACID 1 MG/1
1 TABLET ORAL DAILY
COMMUNITY

## 2018-07-20 RX ORDER — ACETAMINOPHEN 325 MG/1
650 TABLET ORAL EVERY 6 HOURS PRN
COMMUNITY

## 2018-07-20 RX ORDER — PANTOPRAZOLE SODIUM 40 MG/1
40 TABLET, DELAYED RELEASE ORAL EVERY MORNING
Status: DISCONTINUED | OUTPATIENT
Start: 2018-07-20 | End: 2018-07-21 | Stop reason: HOSPADM

## 2018-07-20 RX ORDER — DONEPEZIL HYDROCHLORIDE 5 MG/1
5 TABLET, FILM COATED ORAL 2 TIMES DAILY
Status: DISCONTINUED | OUTPATIENT
Start: 2018-07-20 | End: 2018-07-21 | Stop reason: HOSPADM

## 2018-07-20 RX ORDER — DONEPEZIL HYDROCHLORIDE 5 MG/1
5 TABLET, FILM COATED ORAL 2 TIMES DAILY
COMMUNITY

## 2018-07-20 RX ORDER — TRAZODONE HYDROCHLORIDE 100 MG/1
50 TABLET ORAL NIGHTLY PRN
Status: DISCONTINUED | OUTPATIENT
Start: 2018-07-20 | End: 2018-07-20 | Stop reason: SDUPTHER

## 2018-07-20 RX ORDER — CHOLECALCIFEROL (VITAMIN D3) 1250 MCG
1 CAPSULE ORAL WEEKLY
COMMUNITY

## 2018-07-20 RX ORDER — OLANZAPINE 5 MG/1
5 TABLET, ORALLY DISINTEGRATING ORAL NIGHTLY
Status: DISCONTINUED | OUTPATIENT
Start: 2018-07-20 | End: 2018-07-21 | Stop reason: HOSPADM

## 2018-07-20 RX ORDER — DIVALPROEX SODIUM 125 MG/1
125 CAPSULE, COATED PELLETS ORAL 3 TIMES DAILY
Status: DISCONTINUED | OUTPATIENT
Start: 2018-07-20 | End: 2018-07-21 | Stop reason: HOSPADM

## 2018-07-20 RX ORDER — HALOPERIDOL 5 MG/ML
5 INJECTION INTRAMUSCULAR EVERY 6 HOURS PRN
Status: DISCONTINUED | OUTPATIENT
Start: 2018-07-20 | End: 2018-07-21 | Stop reason: HOSPADM

## 2018-07-20 RX ORDER — HALOPERIDOL 5 MG
5 TABLET ORAL EVERY 4 HOURS PRN
Status: DISCONTINUED | OUTPATIENT
Start: 2018-07-20 | End: 2018-07-21 | Stop reason: HOSPADM

## 2018-07-20 RX ORDER — TRAZODONE HYDROCHLORIDE 50 MG/1
50 TABLET ORAL NIGHTLY PRN
COMMUNITY

## 2018-07-20 RX ORDER — TRAZODONE HYDROCHLORIDE 100 MG/1
50 TABLET ORAL EVERY 6 HOURS PRN
Status: DISCONTINUED | OUTPATIENT
Start: 2018-07-20 | End: 2018-07-21 | Stop reason: HOSPADM

## 2018-07-20 RX ORDER — FERROUS SULFATE 325(65) MG
325 TABLET ORAL
COMMUNITY

## 2018-07-20 RX ORDER — LANOLIN ALCOHOL/MO/W.PET/CERES
3 CREAM (GRAM) TOPICAL NIGHTLY
COMMUNITY

## 2018-07-20 RX ORDER — PANTOPRAZOLE SODIUM 40 MG/1
40 TABLET, DELAYED RELEASE ORAL EVERY MORNING
COMMUNITY

## 2018-07-20 RX ORDER — DIVALPROEX SODIUM 125 MG/1
125 CAPSULE, COATED PELLETS ORAL 3 TIMES DAILY
COMMUNITY

## 2018-07-20 RX ORDER — TRAZODONE HYDROCHLORIDE 50 MG/1
50 TABLET ORAL EVERY 6 HOURS PRN
COMMUNITY

## 2018-07-20 RX ORDER — RISPERIDONE 0.5 MG/1
0.5 TABLET, FILM COATED ORAL 2 TIMES DAILY
COMMUNITY

## 2018-07-20 RX ORDER — INSULIN GLARGINE 100 [IU]/ML
5 INJECTION, SOLUTION SUBCUTANEOUS 2 TIMES DAILY
Status: DISCONTINUED | OUTPATIENT
Start: 2018-07-20 | End: 2018-07-21 | Stop reason: HOSPADM

## 2018-07-20 RX ORDER — RISPERIDONE 0.5 MG/1
0.5 TABLET, FILM COATED ORAL 2 TIMES DAILY
Status: DISCONTINUED | OUTPATIENT
Start: 2018-07-20 | End: 2018-07-20

## 2018-07-20 RX ADMIN — HALOPERIDOL LACTATE 5 MG: 5 INJECTION, SOLUTION INTRAMUSCULAR at 10:25

## 2018-07-20 RX ADMIN — OLANZAPINE 2.5 MG: 5 TABLET, ORALLY DISINTEGRATING ORAL at 13:37

## 2018-07-20 RX ADMIN — SODIUM CHLORIDE: 9 INJECTION, SOLUTION INTRAVENOUS at 04:55

## 2018-07-20 RX ADMIN — DONEPEZIL HYDROCHLORIDE 5 MG: 5 TABLET, FILM COATED ORAL at 20:49

## 2018-07-20 RX ADMIN — OLANZAPINE 5 MG: 5 TABLET, ORALLY DISINTEGRATING ORAL at 20:49

## 2018-07-20 RX ADMIN — ATORVASTATIN CALCIUM 20 MG: 20 TABLET, FILM COATED ORAL at 12:49

## 2018-07-20 RX ADMIN — INSULIN GLARGINE 5 UNITS: 100 INJECTION, SOLUTION SUBCUTANEOUS at 22:32

## 2018-07-20 RX ADMIN — HALOPERIDOL LACTATE 5 MG: 5 INJECTION, SOLUTION INTRAMUSCULAR at 13:57

## 2018-07-20 RX ADMIN — RISPERIDONE 0.5 MG: 0.5 TABLET ORAL at 12:49

## 2018-07-20 RX ADMIN — DIVALPROEX SODIUM 125 MG: 125 CAPSULE, COATED PELLETS ORAL at 20:49

## 2018-07-20 RX ADMIN — INSULIN GLARGINE 5 UNITS: 100 INJECTION, SOLUTION SUBCUTANEOUS at 12:49

## 2018-07-20 RX ADMIN — METFORMIN HYDROCHLORIDE 500 MG: 500 TABLET ORAL at 12:48

## 2018-07-20 RX ADMIN — INSULIN LISPRO 4 UNITS: 100 INJECTION, SOLUTION INTRAVENOUS; SUBCUTANEOUS at 17:53

## 2018-07-20 RX ADMIN — HALOPERIDOL LACTATE 5 MG: 5 INJECTION, SOLUTION INTRAMUSCULAR at 18:03

## 2018-07-20 RX ADMIN — DONEPEZIL HYDROCHLORIDE 5 MG: 5 TABLET, FILM COATED ORAL at 12:49

## 2018-07-20 RX ADMIN — METFORMIN HYDROCHLORIDE 500 MG: 500 TABLET ORAL at 17:52

## 2018-07-20 RX ADMIN — DIVALPROEX SODIUM 125 MG: 125 CAPSULE, COATED PELLETS ORAL at 12:48

## 2018-07-20 RX ADMIN — SODIUM CHLORIDE: 9 INJECTION, SOLUTION INTRAVENOUS at 15:22

## 2018-07-20 RX ADMIN — PANTOPRAZOLE SODIUM 40 MG: 40 TABLET, DELAYED RELEASE ORAL at 12:56

## 2018-07-20 RX ADMIN — INSULIN LISPRO 6 UNITS: 100 INJECTION, SOLUTION INTRAVENOUS; SUBCUTANEOUS at 12:49

## 2018-07-20 ASSESSMENT — ENCOUNTER SYMPTOMS
VOMITING: 0
DIARRHEA: 0
SHORTNESS OF BREATH: 0
NAUSEA: 0
COUGH: 0

## 2018-07-20 NOTE — CONSULTS
drug use       Adverse reactions from psychotropic medications:  None listed in chart     Past Psychiatric History:  Prior Diagnosis: Dementia with behavioral disturbance Schizophrenia ? Psychiatrist: no outpt   Therapist:no outpt  Hospitalization: yes pt was sent to [facility] from [facility]   Hx of Suicidal Attempts: none listed in chart pt denies   Hx of violence:  Yes   ECT: no     Past Medical History:       No date: DJD (degenerative joint disease)  No date: DM II (diabetes mellitus, type II), controlled*  No date: Embedded toenail  No date: Financial difficulties      Comment: Financial constraints  No date: Hypertension  No date: Obesity      Comment: obesity with slight improvement  Past Surgical History:       : UPPER GASTROINTESTINAL ENDOSCOPY  Medications Prior to Admission:   Prescriptions Prior to Admission: ferrous sulfate 325 (65 Fe) MG tablet, Take 325 mg by mouth 3 times daily (with meals)folic acid (FOLVITE) 1 MG tablet, Take 1 mg by mouth dailymagnesium hydroxide (MILK OF MAGNESIA) 400 MG/5ML suspension, Take 5 mLs by mouth daily as needed for Constipationpantoprazole (PROTONIX) 40 MG tablet, Take 40 mg by mouth every morningCholecalciferol (VITAMIN D3) 51614 units CAPS, Take 1 capsule by mouth once a week On mondaydivalproex (DEPAKOTE SPRINKLE) 125 MG capsule, Take 125 mg by mouth 3 times dailydonepezil (ARICEPT) 5 MG tablet, Take 5 mg by mouth 2 times dailyrisperiDONE (RISPERDAL) 0.5 MG tablet, Take 0.5 mg by mouth 2 times dailymelatonin 3 MG TABS tablet, Take 3 mg by mouth nightlyacetaminophen (TYLENOL) 325 MG tablet, Take 650 mg by mouth every 6 hours as needed for Pain or FevertraZODone (DESYREL) 50 MG tablet, Take 50 mg by mouth nightly as needed for Sleep As needed for insomnia. May administer 1 hour after melatonin if no effect. traZODone (DESYREL) 50 MG tablet, Take 50 mg by mouth every 6 hours as needed for Sleep For moderate agitationmetoprolol (LOPRESSOR) 100 MG tablet, Take 1

## 2018-07-20 NOTE — PLAN OF CARE
Problem: Skin Integrity:  Goal: Skin integrity will stabilize  Skin integrity will stabilize   Outcome: Ongoing

## 2018-07-20 NOTE — CARE COORDINATION
LSW spoke to Pts son and he stated that he wants his mother to return back to [facility] 1960. Son stated before that she was at [employer]. Odalis Quick Electronically signed by CLEMENCIA Whalen on 7/20/2018 at 10:44 AM

## 2018-07-20 NOTE — CONSULTS
Hematology/Oncology Consult  Encounter Date: 2018 9:57 AM    Ms. Adrian Villanueva is a 66 y.o. female  : 1940  MRN: [id number]  Acct Number: [de-identified]  Requesting Provider:     Reason for request: Anemia      CONSULTANT: Megan Nam    HPI: Leisa Tenorio was admitted from [facility] for anemia with hemoglobin of 6.6. Review of history showed CBC was normal in . In 2018 hemoglobin at 4.6. Anemia has persisted since then. She had several transfusions. Creatinine at 1.2 . Iron studies suggest anemia from chronic disease. She is confused. Denies melena. We are ask to see her in consult. Patient Active Problem List   Diagnosis    Uncontrolled type 2 diabetes mellitus without complication, without long-term current use of insulin ([address]    Hypertension    Obesity (BMI 30-39. 9)    Mixed hyperlipidemia    Gastroesophageal reflux disease without esophagitis    Anemia     Past Medical History:   Diagnosis Date    DJD (degenerative joint disease)     DM II (diabetes mellitus, type II), controlled ([address]     Embedded toenail     Financial difficulties     Financial constraints    Hypertension     Obesity     obesity with slight improvement     Past Surgical History:   Procedure Laterality Date    UPPER GASTROINTESTINAL ENDOSCOPY       Family History   Problem Relation Age of Onset    Diabetes Mother     Heart Disease Mother     Diabetes Father     Diabetes Sister      Social History     Social History    Marital status: Single     Spouse name: N/A    Number of children: N/A    Years of education: N/A     Occupational History    Not on file.      Social History Main Topics    Smoking status: Former Smoker     Types: Cigarettes     Quit date: 2010    Smokeless tobacco: Never Used    Alcohol use No    Drug use: No    Sexual activity: Not on file     Other Topics Concern    Not on file     Social History Narrative    No narrative on file         Current Facility-Administered Medications   Medication Dose Route Frequency Provider Last Rate Last Dose    haloperidol lactate (HALDOL) injection 5 mg  5 mg Intramuscular Q6H PRN Jared Vela MD        0.9 % sodium chloride infusion   Intravenous Continuous Trini O Portman,  mL/hr at 07/20/18 0455      0.9 % sodium chloride bolus  250 mL Intravenous Once Trini O Portman, DO 20 mL/hr at 07/19/18 2235 250 mL at 07/19/18 2235    sodium chloride flush 0.9 % injection 10 mL  10 mL Intravenous 2 times per day Ralph Cambric, APRN - CNP        sodium chloride flush 0.9 % injection 10 mL  10 mL Intravenous PRN Ralph Cambric, APRN - CNP        magnesium hydroxide (MILK OF MAGNESIA) 400 MG/5ML suspension 30 mL  30 mL Oral Daily PRN Ralph Cambric, APRN - CNP        ondansetron (ZOFRAN) injection 4 mg  4 mg Intravenous Q6H PRN Ralph Cambric, APRN - CNP        glucose (GLUTOSE) 40 % oral gel 15 g  15 g Oral PRN Ralph Cambric, APRN - CNP        dextrose 50 % solution 12.5 g  12.5 g Intravenous PRN Ralph Cambric, APRN - CNP        glucagon (rDNA) injection 1 mg  1 mg Intramuscular PRN Ralph Cambric, APRN - CNP        dextrose 5 % solution  100 mL/hr Intravenous PRN Ralph Cambric, APRN - CNP        insulin lispro (HUMALOG) injection vial 0-12 Units  0-12 Units Subcutaneous TID WC Ralph Cambric, APRN - CNP        insulin lispro (HUMALOG) injection vial 0-6 Units  0-6 Units Subcutaneous Nightly Ralph Cambric, APRN - CNP   3 Units at 07/19/18 2236     No outpatient prescriptions have been marked as taking for the 7/19/18 encounter [facility] Encounter). No Known Allergies     ROS  Not able to answer quastion well.   PHYSICAL EXAMINATION:   VITAL SIGNS: BP (!) 120/53   Pulse 100   Temp 98.3 °F (36.8 °C) (Axillary)   Resp 18   Ht 5' 7\" (1.702 m)   Wt 227 lb 8.2 oz (103.2 kg)   SpO2 93%   BMI 35.63 kg/m²         GENERAL: In no acute distress, well- nourished, well- developed, alert and oriented to person place and time.  SKIN: Warm and dry, without jaundice, ecchymoses, or petechiae. HEENT: Normocephalic, sclera anicteric, oral mucosa moist without lesion or exudate in the visible oral cavity or oropharynx, tongue mid-line with good mobility and no deviation with extension. NODES: No palpable adenopathy in the neck Levels I-V, bilateral   Supraclavicular fossae, axillary chains, or inguinal regions. LUNGS: Good inspiratory effort, no accessory muscle use, clear bilaterally, no focal wheeze, rales or rhonchi. CARDIAC: Regular rate and rhythm, without murmurs, rubs or gallops. ABDOMINAL: Normal bowel sounds present, soft, non-tender, no mass or  organomegaly.     LAB RESULTS:  Recent Results (from the past 24 hour(s))   CBC Auto Differential    Collection Time: 07/19/18 11:45 AM   Result Value Ref Range    WBC 5.2 4.8 - 10.8 K/uL    RBC 2.34 (L) 4.20 - 5.40 M/uL    Hemoglobin 6.9 (LL) 12.0 - 16.0 g/dL    Hematocrit 19.9 (LL) 37.0 - 47.0 %    MCV 85.3 82.0 - 100.0 fL    MCH 29.4 27.0 - 31.3 pg    MCHC 34.5 33.0 - 37.0 %    RDW 15.4 (H) 11.5 - 14.5 %    Platelets 121 926 - 356 K/uL    Neutrophils % 65.5 %    Lymphocytes % 19.8 %    Monocytes % 13.8 %    Eosinophils % 0.3 %    Basophils % 0.6 %    Neutrophils # 3.4 1.4 - 6.5 K/uL    Lymphocytes # 1.0 1.0 - 4.8 K/uL    Monocytes # 0.7 0.2 - 0.8 K/uL    Eosinophils # 0.0 0.0 - 0.7 K/uL    Basophils # 0.0 0.0 - 0.2 K/uL   Comprehensive Metabolic Panel    Collection Time: 07/19/18 11:45 AM   Result Value Ref Range    Sodium 137 132 - 144 mEq/L    Potassium 5.4 (H) 3.5 - 5.1 mEq/L    Chloride 97 (L) 98 - 107 mEq/L    CO2 27 22 - 29 mEq/L    Anion Gap 13 7 - 13 mEq/L    Glucose 297 (H) 74 - 109 mg/dL    BUN 37 (H) 8 - 23 mg/dL    CREATININE 1.37 (H) 0.50 - 0.90 mg/dL    GFR Non-African American 37.3 (L) >60    GFR  45.1 (L) >60    Calcium 9.6 8.6 - 10.2 mg/dL    Total Protein 7.1 6.4 - 8.1 g/dL    Alb 3.8 (L) 3.9 - 4.9 g/dL    Total Bilirubin 0.5 0.0 - 1.2 mg/dL

## 2018-07-21 VITALS
RESPIRATION RATE: 20 BRPM | OXYGEN SATURATION: 98 % | HEART RATE: 96 BPM | TEMPERATURE: 97.3 F | BODY MASS INDEX: 35.71 KG/M2 | SYSTOLIC BLOOD PRESSURE: 105 MMHG | WEIGHT: 227.51 LBS | HEIGHT: 67 IN | DIASTOLIC BLOOD PRESSURE: 69 MMHG

## 2018-07-21 LAB
GLUCOSE BLD-MCNC: 204 MG/DL (ref 60–115)
GLUCOSE BLD-MCNC: 210 MG/DL (ref 60–115)
PERFORMED ON: ABNORMAL
PERFORMED ON: ABNORMAL

## 2018-07-21 PROCEDURE — 6370000000 HC RX 637 (ALT 250 FOR IP): Performed by: CLINICAL NURSE SPECIALIST

## 2018-07-21 PROCEDURE — 6370000000 HC RX 637 (ALT 250 FOR IP): Performed by: INTERNAL MEDICINE

## 2018-07-21 PROCEDURE — 6360000002 HC RX W HCPCS: Performed by: CLINICAL NURSE SPECIALIST

## 2018-07-21 RX ORDER — INSULIN GLARGINE 100 [IU]/ML
5 INJECTION, SOLUTION SUBCUTANEOUS 2 TIMES DAILY
Qty: 1 VIAL | Refills: 3 | Status: SHIPPED | OUTPATIENT
Start: 2018-07-21 | End: 2018-07-26

## 2018-07-21 RX ADMIN — OLANZAPINE 2.5 MG: 5 TABLET, ORALLY DISINTEGRATING ORAL at 08:48

## 2018-07-21 RX ADMIN — PANTOPRAZOLE SODIUM 40 MG: 40 TABLET, DELAYED RELEASE ORAL at 08:48

## 2018-07-21 RX ADMIN — HALOPERIDOL LACTATE 5 MG: 5 INJECTION, SOLUTION INTRAMUSCULAR at 08:27

## 2018-07-21 RX ADMIN — HALOPERIDOL 5 MG: 5 TABLET ORAL at 00:38

## 2018-07-21 RX ADMIN — DIVALPROEX SODIUM 125 MG: 125 CAPSULE, COATED PELLETS ORAL at 08:48

## 2018-07-21 RX ADMIN — DONEPEZIL HYDROCHLORIDE 5 MG: 5 TABLET, FILM COATED ORAL at 08:48

## 2018-07-21 RX ADMIN — HALOPERIDOL LACTATE 5 MG: 5 INJECTION, SOLUTION INTRAMUSCULAR at 03:35

## 2018-07-21 RX ADMIN — INSULIN LISPRO 4 UNITS: 100 INJECTION, SOLUTION INTRAVENOUS; SUBCUTANEOUS at 08:49

## 2018-07-21 RX ADMIN — ATORVASTATIN CALCIUM 20 MG: 20 TABLET, FILM COATED ORAL at 08:48

## 2018-07-21 RX ADMIN — METFORMIN HYDROCHLORIDE 500 MG: 500 TABLET ORAL at 08:48

## 2018-07-21 RX ADMIN — INSULIN GLARGINE 5 UNITS: 100 INJECTION, SOLUTION SUBCUTANEOUS at 08:51

## 2018-07-21 ASSESSMENT — PAIN SCALES - GENERAL: PAINLEVEL_OUTOF10: 0

## 2018-07-21 NOTE — CONSULTS
[address]                       [address], [address] [facility]                                   CONSULTATION    PATIENT NAME: Simone Plunkett                    :        1940  MED REC NO:   [id number]                            ROOM:       U684  ACCOUNT NO:   [de-identified]                           ADMIT DATE: 2018  PROVIDER:     Jacinto Pa MD    CONSULT DATE:  2018    REASON FOR CONSULTATION:  Anemia. HISTORY OF PRESENT ILLNESS:  The patient is a 80-year-old female who was  admitted because of low hemoglobin and hematocrit. The patient has a  history of dementia and is very confused, and she is on one-on-one watch. The patient is not able to provide any history. She has a history of  schizophrenia. According to staff nurse, she reports no abdominal pain,  nausea, or vomiting, and stool for occult blood was done which was  reportedly negative. PAST MEDICAL HISTORY:  Includes diabetes, osteoarthritis, hypertension, and  moderate obesity. PAST SURGICAL HISTORY:  Includes upper endoscopy in the past about nine  years ago. HOME MEDICATIONS:  Lopressor, Glucophage, Prilosec, Norvasc, Glucotrol, and  Lipitor. SOCIAL HISTORY:  She resides at the [facility]. She used to  smoke in the past and stopped many years ago. Does not drink alcohol. REVIEW OF SYSTEMS:  Cannot be obtained. PHYSICAL EXAMINATION:  GENERAL:  A 80-year-old female who seems to be in no acute distress. No  clubbing, no cyanosis. HEART:  S1 and S2 regular. ABDOMEN:  Soft, nontender. No palpable mass. EXTREMITIES:  Show no edema. LABORATORY DATA:  Her lab shows sodium 139, potassium 5.1, chloride is 101,  CO2 of 23, BUN is 39, creatinine is 1.25. Albumin is 3.8. Liver enzymes  are unremarkable. White count is 5.2, hemoglobin and hematocrit was 6.9  and 19.9. The patient received transfusion. Today, it is 8.3 and 24.   MCV  is 87.7, platelet

## 2018-07-21 NOTE — DISCHARGE SUMMARY
Physician Discharge Summary     Patient ID:  Frieda Smith  [id number]  88 y.o.  1940    Admit date: 7/19/2018    Discharge date and time: 7/21/18    Admitting Physician: Jimmy Mahan MD     Discharge Physician: [facility]    Admission Diagnoses: Anemia [D64.9]  Anemia [D64.9]    Discharge Diagnoses: Anemia of chronic dz, DM, dmentia  Past Medical History:   Diagnosis Date    DJD (degenerative joint disease)     DM II (diabetes mellitus, type II), controlled ([address]     Embedded toenail     Financial difficulties     Financial constraints    Hypertension     Obesity     obesity with slight improvement         Admission Condition:stable    Discharged Condition:stable    Indication for Admission: anemia    Hospital Course:s/p 2 units of PRBC, tele    Consults: GI, Hematology, psych    Significant Diagnostic Studies:   Lab Results   Component Value Date    WBC 5.2 07/20/2018    HGB 8.3 (L) 07/20/2018    HCT 24.0 (L) 07/20/2018    MCV 87.7 07/20/2018     07/20/2018     Lab Results   Component Value Date     07/20/2018    K 5.1 07/20/2018     07/20/2018    CO2 23 07/20/2018    BUN 39 07/20/2018    CREATININE 1.25 07/20/2018    GLUCOSE 204 07/20/2018    GLUCOSE 72 06/05/2018    CALCIUM 9.2 07/20/2018        Treatments:   Current Facility-Administered Medications   Medication Dose Route Frequency Provider Last Rate Last Dose    haloperidol lactate (HALDOL) injection 5 mg  5 mg Intramuscular Q6H PRN Jimmy Mahan MD   5 mg at 07/20/18 1357    metFORMIN (GLUCOPHAGE) tablet 500 mg  500 mg Oral BID WC Jimmy Mahan MD   500 mg at 07/21/18 0848    insulin glargine (LANTUS) injection vial 5 Units  5 Units Subcutaneous BID Jimmy Mahan MD   5 Units at 07/21/18 0851    traZODone (DESYREL) tablet 50 mg  50 mg Oral Q6H PRN Jimmy Mahan MD        pantoprazole (PROTONIX) tablet 40 mg  40 mg Oral QAM Jimmy Mahan MD   40 mg at 07/21/18 0848    divalproex (DEPAKOTE SPRINKLE) capsule 125 mg  125 mg Oral tablet  Commonly known as:  DESYREL     * traZODone 50 MG tablet  Commonly known as:  DESYREL     Vitamin D3 11117 units Caps        * This list has 2 medication(s) that are the same as other medications prescribed for you. Read the directions carefully, and ask your doctor or other care provider to review them with you.             STOP taking these medications    amLODIPine 5 MG tablet  Commonly known as:  NORVASC     glipiZIDE 10 MG tablet  Commonly known as:  GLUCOTROL     omeprazole 40 MG delayed release capsule  Commonly known as:  PRILOSEC           Where to Get Your Medications      These medications were sent to [address], [address] [place] [address] [address] [facility]    Phone:  [phone]   · insulin glargine 100 UNIT/ML injection vial  · insulin lispro 100 UNIT/ML injection vial  · insulin lispro 100 UNIT/ML injection vial     Overtime on dc summary was 45 min    Signed:  Gavin Cooper  7/21/2018  10:36 AM

## 2018-07-21 NOTE — FLOWSHEET NOTE
Pt given breakfast along with AM meds. Pt ate only 15% of meal. Pt's IV restarted at this time and  the site is intact. IV infusing without any difficulties. RN Jaylin Greer aware of above.

## 2018-07-25 ENCOUNTER — TELEPHONE (OUTPATIENT)
Dept: FAMILY MEDICINE CLINIC | Age: 78
End: 2018-07-25

## 2018-07-26 RX ORDER — BLOOD SUGAR DIAGNOSTIC
STRIP MISCELLANEOUS
Qty: 150 EACH | Refills: 3 | Status: SHIPPED | OUTPATIENT
Start: 2018-07-26

## 2018-07-27 ENCOUNTER — TELEPHONE (OUTPATIENT)
Dept: FAMILY MEDICINE CLINIC | Age: 78
End: 2018-07-27

## 2018-07-27 NOTE — TELEPHONE ENCOUNTER
Corey Sevilla should be given 10 units once a day, she only needs 1 Humalog prescription she can use it 4 times a day with the same directions. She also needs to follow-up with me in the next couple weeks.

## 2018-11-15 ENCOUNTER — TELEPHONE (OUTPATIENT)
Dept: ENDOCRINOLOGY | Age: 78
End: 2018-11-15

## 2024-04-25 NOTE — PROGRESS NOTES
Pt non-compliant with telemetry on shift  Pt removed an iv on shift, pt   Vital signs WNL,  no signs of blood in stool Yes